# Patient Record
Sex: MALE | Race: WHITE | NOT HISPANIC OR LATINO | Employment: FULL TIME | URBAN - METROPOLITAN AREA
[De-identification: names, ages, dates, MRNs, and addresses within clinical notes are randomized per-mention and may not be internally consistent; named-entity substitution may affect disease eponyms.]

---

## 2020-07-13 ENCOUNTER — TRANSCRIBE ORDERS (OUTPATIENT)
Dept: ADMINISTRATIVE | Facility: HOSPITAL | Age: 55
End: 2020-07-13

## 2020-07-13 DIAGNOSIS — R07.9 CHEST PAIN, UNSPECIFIED TYPE: Primary | ICD-10-CM

## 2020-07-21 ENCOUNTER — APPOINTMENT (OUTPATIENT)
Dept: RESPIRATORY THERAPY | Facility: HOSPITAL | Age: 55
End: 2020-07-21

## 2020-07-21 ENCOUNTER — APPOINTMENT (OUTPATIENT)
Dept: CARDIOLOGY | Facility: HOSPITAL | Age: 55
End: 2020-07-21

## 2020-08-01 ENCOUNTER — LAB (OUTPATIENT)
Dept: LAB | Facility: HOSPITAL | Age: 55
End: 2020-08-01

## 2020-08-01 DIAGNOSIS — Z01.811 PRE-OPERATIVE RESPIRATORY EXAMINATION: Primary | ICD-10-CM

## 2020-08-01 PROCEDURE — C9803 HOPD COVID-19 SPEC COLLECT: HCPCS

## 2020-08-01 PROCEDURE — U0002 COVID-19 LAB TEST NON-CDC: HCPCS

## 2020-08-01 PROCEDURE — U0004 COV-19 TEST NON-CDC HGH THRU: HCPCS

## 2020-08-02 LAB
REF LAB TEST METHOD: NORMAL
SARS-COV-2 RNA RESP QL NAA+PROBE: NOT DETECTED

## 2020-08-03 ENCOUNTER — HOSPITAL ENCOUNTER (OUTPATIENT)
Dept: CARDIOLOGY | Facility: HOSPITAL | Age: 55
Discharge: HOME | End: 2020-08-03
Admitting: FAMILY MEDICINE

## 2020-08-03 ENCOUNTER — APPOINTMENT (OUTPATIENT)
Dept: NUCLEAR MEDICINE | Facility: HOSPITAL | Age: 55
End: 2020-08-03

## 2020-08-03 ENCOUNTER — HOSPITAL ENCOUNTER (OUTPATIENT)
Dept: RESPIRATORY THERAPY | Facility: HOSPITAL | Age: 55
Discharge: HOME | End: 2020-08-03

## 2020-08-03 VITALS
BODY MASS INDEX: 25.06 KG/M2 | HEIGHT: 72 IN | SYSTOLIC BLOOD PRESSURE: 156 MMHG | DIASTOLIC BLOOD PRESSURE: 101 MMHG | WEIGHT: 185 LBS

## 2020-08-03 DIAGNOSIS — R07.9 CHEST PAIN, UNSPECIFIED TYPE: ICD-10-CM

## 2020-08-03 LAB
BH CV ECHO MEAS - ACS: 2.5 CM
BH CV ECHO MEAS - AO MAX PG (FULL): -0.34 MMHG
BH CV ECHO MEAS - AO MAX PG: 5.1 MMHG
BH CV ECHO MEAS - AO MEAN PG (FULL): 0.15 MMHG
BH CV ECHO MEAS - AO MEAN PG: 2.5 MMHG
BH CV ECHO MEAS - AO ROOT AREA (BSA CORRECTED): 1.8
BH CV ECHO MEAS - AO ROOT AREA: 11.2 CM^2
BH CV ECHO MEAS - AO ROOT DIAM: 3.8 CM
BH CV ECHO MEAS - AO V2 MAX: 113.2 CM/SEC
BH CV ECHO MEAS - AO V2 MEAN: 72.6 CM/SEC
BH CV ECHO MEAS - AO V2 VTI: 23.1 CM
BH CV ECHO MEAS - ASC AORTA: 3.5 CM
BH CV ECHO MEAS - AVA(I,A): 4.3 CM^2
BH CV ECHO MEAS - AVA(I,D): 4.3 CM^2
BH CV ECHO MEAS - AVA(V,A): 4.3 CM^2
BH CV ECHO MEAS - AVA(V,D): 4.3 CM^2
BH CV ECHO MEAS - BSA(HAYCOCK): 2.1 M^2
BH CV ECHO MEAS - BSA: 2.1 M^2
BH CV ECHO MEAS - BZI_BMI: 25.1 KILOGRAMS/M^2
BH CV ECHO MEAS - BZI_METRIC_HEIGHT: 182.9 CM
BH CV ECHO MEAS - BZI_METRIC_WEIGHT: 83.9 KG
BH CV ECHO MEAS - EDV(CUBED): 63.9 ML
BH CV ECHO MEAS - EDV(MOD-SP4): 87.4 ML
BH CV ECHO MEAS - EDV(TEICH): 69.9 ML
BH CV ECHO MEAS - EF(CUBED): 74.8 %
BH CV ECHO MEAS - EF(MOD-BP): 66 %
BH CV ECHO MEAS - EF(MOD-SP4): 65.6 %
BH CV ECHO MEAS - EF(TEICH): 67.3 %
BH CV ECHO MEAS - ESV(CUBED): 16.1 ML
BH CV ECHO MEAS - ESV(MOD-SP4): 30 ML
BH CV ECHO MEAS - ESV(TEICH): 22.9 ML
BH CV ECHO MEAS - FS: 36.8 %
BH CV ECHO MEAS - IVS/LVPW: 0.94
BH CV ECHO MEAS - IVSD: 1.2 CM
BH CV ECHO MEAS - LA DIMENSION(2D): 3.8 CM
BH CV ECHO MEAS - LV DIASTOLIC VOL/BSA (35-75): 42.4 ML/M^2
BH CV ECHO MEAS - LV MASS(C)D: 182.4 GRAMS
BH CV ECHO MEAS - LV MASS(C)DI: 88.5 GRAMS/M^2
BH CV ECHO MEAS - LV MAX PG: 5.5 MMHG
BH CV ECHO MEAS - LV MEAN PG: 2.4 MMHG
BH CV ECHO MEAS - LV SYSTOLIC VOL/BSA (12-30): 14.6 ML/M^2
BH CV ECHO MEAS - LV V1 MAX: 116.9 CM/SEC
BH CV ECHO MEAS - LV V1 MEAN: 70.3 CM/SEC
BH CV ECHO MEAS - LV V1 VTI: 24 CM
BH CV ECHO MEAS - LVIDD: 4 CM
BH CV ECHO MEAS - LVIDS: 2.5 CM
BH CV ECHO MEAS - LVOT AREA: 4.2 CM^2
BH CV ECHO MEAS - LVOT DIAM: 2.3 CM
BH CV ECHO MEAS - LVPWD: 1.3 CM
BH CV ECHO MEAS - MV A MAX VEL: 45.9 CM/SEC
BH CV ECHO MEAS - MV DEC SLOPE: 410.4 CM/SEC^2
BH CV ECHO MEAS - MV DEC TIME: 0.19 SEC
BH CV ECHO MEAS - MV E MAX VEL: 76.1 CM/SEC
BH CV ECHO MEAS - MV E/A: 1.7
BH CV ECHO MEAS - MV MAX PG: 2.1 MMHG
BH CV ECHO MEAS - MV MEAN PG: 0.76 MMHG
BH CV ECHO MEAS - MV V2 MAX: 71.8 CM/SEC
BH CV ECHO MEAS - MV V2 MEAN: 40.9 CM/SEC
BH CV ECHO MEAS - MV V2 VTI: 26.3 CM
BH CV ECHO MEAS - MVA(VTI): 3.8 CM^2
BH CV ECHO MEAS - PA ACC TIME: 0.1 SEC
BH CV ECHO MEAS - PA PR(ACCEL): 35.4 MMHG
BH CV ECHO MEAS - PULM A REVS DUR: 0.13 SEC
BH CV ECHO MEAS - PULM A REVS VEL: 31.4 CM/SEC
BH CV ECHO MEAS - PULM DIAS VEL: 42.6 CM/SEC
BH CV ECHO MEAS - PULM S/D: 1.2
BH CV ECHO MEAS - PULM SYS VEL: 52.2 CM/SEC
BH CV ECHO MEAS - QP/QS: 0.98
BH CV ECHO MEAS - RAP SYSTOLE: 3 MMHG
BH CV ECHO MEAS - RV MAX PG: 1.9 MMHG
BH CV ECHO MEAS - RV MEAN PG: 0.96 MMHG
BH CV ECHO MEAS - RV V1 MAX: 68.4 CM/SEC
BH CV ECHO MEAS - RV V1 MEAN: 45.6 CM/SEC
BH CV ECHO MEAS - RV V1 VTI: 16.2 CM
BH CV ECHO MEAS - RVDD: 2.7 CM
BH CV ECHO MEAS - RVOT AREA: 6 CM^2
BH CV ECHO MEAS - RVOT DIAM: 2.8 CM
BH CV ECHO MEAS - RVSP: 19 MMHG
BH CV ECHO MEAS - SI(AO): 125.2 ML/M^2
BH CV ECHO MEAS - SI(CUBED): 23.2 ML/M^2
BH CV ECHO MEAS - SI(LVOT): 48.4 ML/M^2
BH CV ECHO MEAS - SI(MOD-SP4): 27.8 ML/M^2
BH CV ECHO MEAS - SI(TEICH): 22.8 ML/M^2
BH CV ECHO MEAS - SV(AO): 258 ML
BH CV ECHO MEAS - SV(CUBED): 47.8 ML
BH CV ECHO MEAS - SV(LVOT): 99.7 ML
BH CV ECHO MEAS - SV(MOD-SP4): 57.3 ML
BH CV ECHO MEAS - SV(RVOT): 97.5 ML
BH CV ECHO MEAS - SV(TEICH): 47 ML
BH CV ECHO MEAS - TR MAX VEL: 200 CM/SEC
BH CV STRESS BP STAGE 1: NORMAL
BH CV STRESS BP STAGE 2: NORMAL
BH CV STRESS BP STAGE 3: NORMAL
BH CV STRESS DURATION MIN STAGE 1: 3
BH CV STRESS DURATION MIN STAGE 2: 3
BH CV STRESS DURATION MIN STAGE 3: 3
BH CV STRESS DURATION SEC STAGE 1: 0
BH CV STRESS DURATION SEC STAGE 2: 0
BH CV STRESS DURATION SEC STAGE 3: 0
BH CV STRESS GRADE STAGE 1: 10
BH CV STRESS GRADE STAGE 2: 12
BH CV STRESS GRADE STAGE 3: 14
BH CV STRESS HR STAGE 1: 87
BH CV STRESS HR STAGE 2: 112
BH CV STRESS HR STAGE 3: 141
BH CV STRESS METS STAGE 1: 5
BH CV STRESS METS STAGE 2: 7.5
BH CV STRESS METS STAGE 3: 10
BH CV STRESS PROTOCOL 1: NORMAL
BH CV STRESS RECOVERY BP: NORMAL MMHG
BH CV STRESS RECOVERY HR: 82 BPM
BH CV STRESS SPEED STAGE 1: 1.7
BH CV STRESS SPEED STAGE 2: 2.5
BH CV STRESS SPEED STAGE 3: 3.4
BH CV STRESS STAGE 1: 1
BH CV STRESS STAGE 2: 2
BH CV STRESS STAGE 3: 3
LV EF 2D ECHO EST: 60 %
MAXIMAL PREDICTED HEART RATE: 164 BPM
PERCENT MAX PREDICTED HR: 87.8 %
STRESS BASELINE BP: NORMAL MMHG
STRESS BASELINE HR: 67 BPM
STRESS PERCENT HR: 103 %
STRESS POST EXERCISE DUR MIN: 8 MIN
STRESS POST EXERCISE DUR SEC: 21 SEC
STRESS POST PEAK BP: NORMAL MMHG
STRESS POST PEAK HR: 144 BPM
STRESS TARGET HR: 139 BPM

## 2020-08-03 PROCEDURE — 93306 TTE W/DOPPLER COMPLETE: CPT

## 2020-08-03 PROCEDURE — 93018 CV STRESS TEST I&R ONLY: CPT | Performed by: NURSE PRACTITIONER

## 2020-08-03 PROCEDURE — 93017 CV STRESS TEST TRACING ONLY: CPT

## 2020-08-03 PROCEDURE — 93306 TTE W/DOPPLER COMPLETE: CPT | Performed by: INTERNAL MEDICINE

## 2020-11-23 ENCOUNTER — OFFICE VISIT (OUTPATIENT)
Dept: PODIATRY | Facility: CLINIC | Age: 55
End: 2020-11-23

## 2020-11-23 VITALS
WEIGHT: 193 LBS | DIASTOLIC BLOOD PRESSURE: 87 MMHG | HEART RATE: 79 BPM | SYSTOLIC BLOOD PRESSURE: 145 MMHG | HEIGHT: 72 IN | BODY MASS INDEX: 26.14 KG/M2

## 2020-11-23 DIAGNOSIS — M77.41 METATARSALGIA OF BOTH FEET: ICD-10-CM

## 2020-11-23 DIAGNOSIS — M72.2 PLANTAR FASCIITIS, BILATERAL: ICD-10-CM

## 2020-11-23 DIAGNOSIS — M79.672 BILATERAL FOOT PAIN: Primary | ICD-10-CM

## 2020-11-23 DIAGNOSIS — M77.42 METATARSALGIA OF BOTH FEET: ICD-10-CM

## 2020-11-23 DIAGNOSIS — M79.671 BILATERAL FOOT PAIN: Primary | ICD-10-CM

## 2020-11-23 PROCEDURE — 99203 OFFICE O/P NEW LOW 30 MIN: CPT | Performed by: PODIATRIST

## 2020-11-23 RX ORDER — LOSARTAN POTASSIUM 50 MG/1
50 TABLET ORAL DAILY
COMMUNITY
Start: 2020-10-27

## 2020-11-23 RX ORDER — PREDNISONE 20 MG/1
20 TABLET ORAL DAILY
COMMUNITY
Start: 2020-11-18 | End: 2020-12-28

## 2020-11-24 NOTE — PROGRESS NOTES
11/23/2020  Foot and Ankle Surgery - New Patient   Provider: Dr. Jair Roy DPM  Location: [facility]    Subjective:  Cain Snow is a 55 y.o. male.     Chief Complaint   Patient presents with   • Right Foot - Pain   • Left Foot - Pain       HPI: Patient is a 55-year-old male that presents with pain involving his heels and forefoot regions.  States that these symptoms have been bothering him for several months and gradually progressive.  He has noticed recent exacerbation which makes it difficult for him to walk and perform his normal job duties.  He rates the pain an 8 out of 10 most days.  His symptoms are worse at work and on his feet for several hours a day and better with rest.  He also notices limitation with first few steps in the morning.  He denies any previous injuries.  He has not had issues like this in the past.  He also complains of pain gradually progressing to the distal lateral forefoot on the right lower extremity.    No Known Allergies    History reviewed. No pertinent past medical history.    History reviewed. No pertinent surgical history.    History reviewed. No pertinent family history.    Social History     Socioeconomic History   • Marital status:      Spouse name: Not on file   • Number of children: Not on file   • Years of education: Not on file   • Highest education level: Not on file   Tobacco Use   • Smoking status: Never Smoker        Current Outpatient Medications on File Prior to Visit   Medication Sig Dispense Refill   • losartan (COZAAR) 50 MG tablet Take 50 mg by mouth Daily.     • predniSONE (DELTASONE) 20 MG tablet Take 20 mg by mouth Daily.       No current facility-administered medications on file prior to visit.        Review of Systems:  General: Denies fever, chills, fatigue, and weakness.  Eyes: Denies vision loss, blurry vision, and excessive redness.  ENT: Denies hearing issues and difficulty swallowing.  Cardiovascular: Denies palpitations,  "chest pain, or syncopal episodes.  Respiratory: Denies shortness of breath, wheezing, and coughing.  GI: Denies abdominal pain, nausea, and vomiting.   : Denies frequency, hematuria, and urgency.  Musculoskeletal: + Bilateral foot pain  Derm: Denies rash, open wounds, or suspicious lesions.  Neuro: Denies headaches, numbness, loss of coordination, and tremors.  Psych: Denies anxiety and depression.  Endocrine: Denies temperature intolerance and changes in appetite.  Heme: Denies bleeding disorders or abnormal bruising.     Objective   /87   Pulse 79   Ht 182.9 cm (72\")   Wt 87.5 kg (193 lb)   BMI 26.18 kg/m²     Foot/Ankle Exam:       General:   Appearance: appears stated age and healthy    Orientation: AAOx3    Affect: appropriate    Gait: antalgic      VASCULAR      Right Foot Vascularity   Normal vascular exam    Dorsalis pedis:  2+  Posterior tibial:  2+  Skin Temperature: warm    Edema Grading:  None  CFT:  < 3 seconds  Pedal Hair Growth:  Present  Varicosities: none       Left Foot Vascularity   Normal vascular exam    Dorsalis pedis:  2+  Posterior tibial:  2+  Skin Temperature: warm    Edema Grading:  None  CFT:  < 3 seconds  Pedal Hair Growth:  Present  Varicosities: none        NEUROLOGIC     Right Foot Neurologic   Light touch sensation:  Normal  Hot/Cold sensation: normal    Achilles reflex:  2+     Left Foot Neurologic   Light touch sensation:  Normal  Hot/cold sensation: normal    Achilles reflex:  2+     MUSCULOSKELETAL      Right Foot Musculoskeletal   Ecchymosis:  None  Tenderness: lesser metatarsophalangeal joints, plantar fascia, plantar heel and 5th MTPJ    Arch:  Normal     Left Foot Musculoskeletal   Ecchymosis:  None  Tenderness: lesser metatarsophalangeal joints, plantar fascia and plantar heel    Arch:  Normal     MUSCLE STRENGTH     Right Foot Muscle Strength   Normal strength    Foot dorsiflexion:  5  Foot plantar flexion:  5  Foot inversion:  5  Foot eversion:  5     Left Foot " Muscle Strength   Normal strength    Foot dorsiflexion:  5  Foot plantar flexion:  5  Foot inversion:  5  Foot eversion:  5     DERMATOLOGIC     Right Foot Dermatologic   Skin: skin intact       Left Foot Dermatologic   Skin: skin intact       TESTS     Right Foot Tests   Calcaneal squeeze: positive       Left Foot Tests   Calcaneal squeeze: positive        Right Foot Additional Comments Moderate equinus contracture with knee extended and flexed, bilateral.  Pain with palpation to the plantar medial calcaneal tuberosity region.  No gross deformity or instability.  Moderate soft tissue contracture to both feet      Assessment/Plan   Diagnoses and all orders for this visit:    1. Bilateral foot pain (Primary)  -     XR Foot 3+ View Bilateral    2. Plantar fasciitis, bilateral    3. Metatarsalgia of both feet      Patient presents with pain involving the predominantly the plantar aspects of both heels.  He also complains of pain involving the plantar lateral aspect of the right forefoot.  I explained that his symptoms are consistent with plantar fasciitis.  The pain to the forefoot region on the right lower extremity is likely secondary to compensation and altered weightbearing.  We did discuss the diagnoses and treatment options at length.  He does not have any obvious signs of inflammation to the heels today and I have recommended that we proceed with conservative options including over-the-counter arch supports with metatarsal pad for forefoot offloading.  We did review proper use and effects of the devices.  I have also recommended that he perform stretching and manual therapy on a routine basis.  He is to avoid barefoot and unsupported weightbearing.  We did discuss rice therapy and proper use of anti-inflammatories.  I would like to see him in 4 weeks for reevaluation.  If he continues to have pain, may consider steroid injections at that time.    Orders Placed This Encounter   Procedures   • XR Foot 3+ View  Bilateral     RM 14, WB, bilateral foot pain 3 months     Order Specific Question:   Reason for Exam:     Answer:   bilateral foot pain     Order Specific Question:   Does this patient have a diabetic monitoring/medication delivering device on?     Answer:   No        Note is dictated utilizing voice recognition software. Unfortunately this leads to occasional typographical errors. I apologize in advance if the situation occurs. If questions occur please do not hesitate to call our office.

## 2020-11-24 NOTE — PATIENT INSTRUCTIONS
Plantar Fasciitis    Plantar fasciitis is a painful foot condition that affects the heel. It occurs when the band of tissue that connects the toes to the heel bone (plantar fascia) becomes irritated. This can happen as the result of exercising too much or doing other repetitive activities (overuse injury).  The pain from plantar fasciitis can range from mild irritation to severe pain that makes it difficult to walk or move. The pain is usually worse in the morning after sleeping, or after sitting or lying down for a while. Pain may also be worse after long periods of walking or standing.  What are the causes?  This condition may be caused by:  · Standing for long periods of time.  · Wearing shoes that do not have good arch support.  · Doing activities that put stress on joints (high-impact activities), including running, aerobics, and ballet.  · Being overweight.  · An abnormal way of walking (gait).  · Tight muscles in the back of your lower leg (calf).  · High arches in your feet.  · Starting a new athletic activity.  What are the signs or symptoms?  The main symptom of this condition is heel pain. Pain may:  · Be worse with first steps after a time of rest, especially in the morning after sleeping or after you have been sitting or lying down for a while.  · Be worse after long periods of standing still.  · Decrease after 30-45 minutes of activity, such as gentle walking.  How is this diagnosed?  This condition may be diagnosed based on your medical history and your symptoms. Your health care provider may ask questions about your activity level. Your health care provider will do a physical exam to check for:  · A tender area on the bottom of your foot.  · A high arch in your foot.  · Pain when you move your foot.  · Difficulty moving your foot.  You may have imaging tests to confirm the diagnosis, such as:  · X-rays.  · Ultrasound.  · MRI.  How is this treated?  Treatment for plantar fasciitis depends on how  severe your condition is. Treatment may include:  · Rest, ice, applying pressure (compression), and raising the affected foot (elevation). This may be called RICE therapy. Your health care provider may recommend RICE therapy along with over-the-counter pain medicines to manage your pain.  · Exercises to stretch your calves and your plantar fascia.  · A splint that holds your foot in a stretched, upward position while you sleep (night splint).  · Physical therapy to relieve symptoms and prevent problems in the future.  · Injections of steroid medicine (cortisone) to relieve pain and inflammation.  · Stimulating your plantar fascia with electrical impulses (extracorporeal shock wave therapy). This is usually the last treatment option before surgery.  · Surgery, if other treatments have not worked after 12 months.  Follow these instructions at home:    Managing pain, stiffness, and swelling  · If directed, put ice on the painful area:  ? Put ice in a plastic bag, or use a frozen bottle of water.  ? Place a towel between your skin and the bag or bottle.  ? Roll the bottom of your foot over the bag or bottle.  ? Do this for 20 minutes, 2-3 times a day.  · Wear athletic shoes that have air-sole or gel-sole cushions, or try wearing soft shoe inserts that are designed for plantar fasciitis.  · Raise (elevate) your foot above the level of your heart while you are sitting or lying down.  Activity  · Avoid activities that cause pain. Ask your health care provider what activities are safe for you.  · Do physical therapy exercises and stretches as told by your health care provider.  · Try activities and forms of exercise that are easier on your joints (low-impact). Examples include swimming, water aerobics, and biking.  General instructions  · Take over-the-counter and prescription medicines only as told by your health care provider.  · Wear a night splint while sleeping, if told by your health care provider. Loosen the splint  if your toes tingle, become numb, or turn cold and blue.  · Maintain a healthy weight, or work with your health care provider to lose weight as needed.  · Keep all follow-up visits as told by your health care provider. This is important.  Contact a health care provider if you:  · Have symptoms that do not go away after caring for yourself at home.  · Have pain that gets worse.  · Have pain that affects your ability to move or do your daily activities.  Summary  · Plantar fasciitis is a painful foot condition that affects the heel. It occurs when the band of tissue that connects the toes to the heel bone (plantar fascia) becomes irritated.  · The main symptom of this condition is heel pain that may be worse after exercising too much or standing still for a long time.  · Treatment varies, but it usually starts with rest, ice, compression, and elevation (RICE therapy) and over-the-counter medicines to manage pain.  This information is not intended to replace advice given to you by your health care provider. Make sure you discuss any questions you have with your health care provider.  Document Revised: 11/30/2018 Document Reviewed: 10/15/2018  Elsevier Patient Education © 2020 [employer]

## 2020-12-28 ENCOUNTER — OFFICE VISIT (OUTPATIENT)
Dept: PODIATRY | Facility: CLINIC | Age: 55
End: 2020-12-28

## 2020-12-28 VITALS
BODY MASS INDEX: 25.6 KG/M2 | HEIGHT: 72 IN | DIASTOLIC BLOOD PRESSURE: 84 MMHG | HEART RATE: 80 BPM | WEIGHT: 189 LBS | SYSTOLIC BLOOD PRESSURE: 120 MMHG

## 2020-12-28 DIAGNOSIS — M20.41 HAMMERTOES OF BOTH FEET: ICD-10-CM

## 2020-12-28 DIAGNOSIS — M77.41 METATARSALGIA OF BOTH FEET: ICD-10-CM

## 2020-12-28 DIAGNOSIS — M77.42 METATARSALGIA OF BOTH FEET: ICD-10-CM

## 2020-12-28 DIAGNOSIS — M72.2 PLANTAR FASCIITIS, BILATERAL: Primary | ICD-10-CM

## 2020-12-28 DIAGNOSIS — M20.42 HAMMERTOES OF BOTH FEET: ICD-10-CM

## 2020-12-28 PROCEDURE — 99213 OFFICE O/P EST LOW 20 MIN: CPT | Performed by: PODIATRIST

## 2020-12-28 RX ORDER — DICLOFENAC SODIUM 20 MG/G
SOLUTION TOPICAL
COMMUNITY
Start: 2020-11-24

## 2020-12-28 NOTE — PROGRESS NOTES
"12/28/2020  Foot and Ankle Surgery - Established Patient/Follow-up  Provider: Dr. Jair Roy DPM  Location: [facility]    Subjective:  Cain Snow is a 55 y.o. male.     Chief Complaint   Patient presents with   • Left Foot - Follow-up   • Right Foot - Follow-up       HPI: Patient returns for follow-up on bilateral foot pain.  He states that he has noticed improvement with the inserts.  He continues to have discomfort to the heels with increased activity or standing.  He also notes bruising to his toenails and discomfort to the forefoot with barefoot walking.    No Known Allergies    Current Outpatient Medications on File Prior to Visit   Medication Sig Dispense Refill   • Ibuprofen-Famotidine (DUEXIS PO) Take  by mouth.     • losartan (COZAAR) 50 MG tablet Take 50 mg by mouth Daily.     • Pennsaid 2 % solution      • [DISCONTINUED] predniSONE (DELTASONE) 20 MG tablet Take 20 mg by mouth Daily.       No current facility-administered medications on file prior to visit.        Objective   /84   Pulse 80   Ht 182.9 cm (72\")   Wt 85.7 kg (189 lb)   BMI 25.63 kg/m²      General:   Appearance: appears stated age and healthy    Orientation: AAOx3    Affect: appropriate    Gait: antalgic       VASCULAR       Right Foot Vascularity   Normal vascular exam    Dorsalis pedis:  2+  Posterior tibial:  2+  Skin Temperature: warm    Edema Grading:  None  CFT:  < 3 seconds  Pedal Hair Growth:  Present  Varicosities: none        Left Foot Vascularity   Normal vascular exam    Dorsalis pedis:  2+  Posterior tibial:  2+  Skin Temperature: warm    Edema Grading:  None  CFT:  < 3 seconds  Pedal Hair Growth:  Present  Varicosities: none        NEUROLOGIC      Right Foot Neurologic   Light touch sensation:  Normal  Hot/Cold sensation: normal    Achilles reflex:  2+      Left Foot Neurologic   Light touch sensation:  Normal  Hot/cold sensation: normal    Achilles reflex:  2+      MUSCULOSKELETAL       Right " Foot Musculoskeletal   Ecchymosis:  None  Tenderness: lesser metatarsophalangeal joints, plantar fascia, plantar heel and 5th MTPJ    Arch:  Normal      Left Foot Musculoskeletal   Ecchymosis:  None  Tenderness: lesser metatarsophalangeal joints, plantar fascia and plantar heel    Arch:  Normal      MUSCLE STRENGTH      Right Foot Muscle Strength   Normal strength    Foot dorsiflexion:  5  Foot plantar flexion:  5  Foot inversion:  5  Foot eversion:  5      Left Foot Muscle Strength   Normal strength    Foot dorsiflexion:  5  Foot plantar flexion:  5  Foot inversion:  5  Foot eversion:  5      DERMATOLOGIC      Right Foot Dermatologic   Skin: skin intact        Left Foot Dermatologic   Skin: skin intact        TESTS      Right Foot Tests   Calcaneal squeeze: positive        Left Foot Tests   Calcaneal squeeze: positive       Foot Additional Comments: Less discomfort with palpation to the heel regions.  Semirigid hammer digit deformities, bilateral.  No prominent pain or limitations of the forefoot region.  No progressive deformity or instability.  Moderate equinus and soft tissue rigidity to both lower extremities    Assessment/Plan   Diagnoses and all orders for this visit:    1. Plantar fasciitis, bilateral (Primary)    2. Metatarsalgia of both feet    3. Hammertoes of both feet      Patient continues to have mild discomfort involving the heel regions.  He has noticed improvement with the inserts to the heel and forefoot regions.  He does have semirigid hammer digit deformities.  I have asked that he continue with the inserts and I have also recommended that he obtain crest pads.  We did discuss proper use and effects.  I also feel that he would benefit from formal physical therapy.  Patient understands and agrees.  I have asked that he continue to wear the inserts on a daily basis and perform at home exercises.  He is to monitor closely and call with any additional issues or concerns.  I would like to see him in  6 weeks for reevaluation.    No orders of the defined types were placed in this encounter.         Note is dictated utilizing voice recognition software. Unfortunately this leads to occasional typographical errors. I apologize in advance if the situation occurs. If questions occur please do not hesitate to call our office.

## 2023-06-14 ENCOUNTER — OFFICE (OUTPATIENT)
Dept: URBAN - METROPOLITAN AREA CLINIC 64 | Facility: CLINIC | Age: 58
End: 2023-06-14

## 2023-06-14 VITALS
HEART RATE: 68 BPM | DIASTOLIC BLOOD PRESSURE: 90 MMHG | HEIGHT: 72 IN | WEIGHT: 197 LBS | SYSTOLIC BLOOD PRESSURE: 136 MMHG

## 2023-06-14 DIAGNOSIS — R19.7 DIARRHEA, UNSPECIFIED: ICD-10-CM

## 2023-06-14 DIAGNOSIS — R19.4 CHANGE IN BOWEL HABIT: ICD-10-CM

## 2023-06-14 DIAGNOSIS — R15.2 FECAL URGENCY: ICD-10-CM

## 2023-06-14 PROCEDURE — 99203 OFFICE O/P NEW LOW 30 MIN: CPT | Performed by: NURSE PRACTITIONER

## 2023-06-23 ENCOUNTER — OFFICE (OUTPATIENT)
Dept: URBAN - METROPOLITAN AREA PATHOLOGY 4 | Facility: PATHOLOGY | Age: 58
End: 2023-06-23

## 2023-06-23 ENCOUNTER — ON CAMPUS - OUTPATIENT (OUTPATIENT)
Dept: URBAN - METROPOLITAN AREA HOSPITAL 2 | Facility: HOSPITAL | Age: 58
End: 2023-06-23

## 2023-06-23 VITALS
SYSTOLIC BLOOD PRESSURE: 126 MMHG | OXYGEN SATURATION: 95 % | OXYGEN SATURATION: 97 % | DIASTOLIC BLOOD PRESSURE: 117 MMHG | SYSTOLIC BLOOD PRESSURE: 153 MMHG | DIASTOLIC BLOOD PRESSURE: 84 MMHG | DIASTOLIC BLOOD PRESSURE: 95 MMHG | RESPIRATION RATE: 20 BRPM | SYSTOLIC BLOOD PRESSURE: 141 MMHG | HEART RATE: 70 BPM | SYSTOLIC BLOOD PRESSURE: 116 MMHG | SYSTOLIC BLOOD PRESSURE: 122 MMHG | SYSTOLIC BLOOD PRESSURE: 123 MMHG | RESPIRATION RATE: 16 BRPM | DIASTOLIC BLOOD PRESSURE: 91 MMHG | RESPIRATION RATE: 18 BRPM | DIASTOLIC BLOOD PRESSURE: 96 MMHG | DIASTOLIC BLOOD PRESSURE: 79 MMHG | HEART RATE: 80 BPM | HEART RATE: 81 BPM | OXYGEN SATURATION: 96 % | TEMPERATURE: 97.5 F | HEART RATE: 79 BPM | HEIGHT: 72 IN | SYSTOLIC BLOOD PRESSURE: 127 MMHG | HEART RATE: 84 BPM | DIASTOLIC BLOOD PRESSURE: 93 MMHG | WEIGHT: 200 LBS

## 2023-06-23 DIAGNOSIS — D12.3 BENIGN NEOPLASM OF TRANSVERSE COLON: ICD-10-CM

## 2023-06-23 DIAGNOSIS — K57.30 DIVERTICULOSIS OF LARGE INTESTINE WITHOUT PERFORATION OR ABS: ICD-10-CM

## 2023-06-23 DIAGNOSIS — D12.2 BENIGN NEOPLASM OF ASCENDING COLON: ICD-10-CM

## 2023-06-23 DIAGNOSIS — K52.9 NONINFECTIVE GASTROENTERITIS AND COLITIS, UNSPECIFIED: ICD-10-CM

## 2023-06-23 DIAGNOSIS — R19.4 CHANGE IN BOWEL HABIT: ICD-10-CM

## 2023-06-23 DIAGNOSIS — K64.1 SECOND DEGREE HEMORRHOIDS: ICD-10-CM

## 2023-06-23 PROBLEM — K63.5 POLYP OF COLON: Status: ACTIVE | Noted: 2023-06-23

## 2023-06-23 LAB
GI HISTOLOGY: A. SELECT: (no result)
GI HISTOLOGY: B. SELECT: (no result)
GI HISTOLOGY: C. UNSPECIFIED: (no result)
GI HISTOLOGY: D. UNSPECIFIED: (no result)
GI HISTOLOGY: PDF REPORT: (no result)

## 2023-06-23 PROCEDURE — 45380 COLONOSCOPY AND BIOPSY: CPT | Mod: 59 | Performed by: INTERNAL MEDICINE

## 2023-06-23 PROCEDURE — 88305 TISSUE EXAM BY PATHOLOGIST: CPT | Performed by: INTERNAL MEDICINE

## 2023-06-23 PROCEDURE — 45385 COLONOSCOPY W/LESION REMOVAL: CPT | Performed by: INTERNAL MEDICINE

## 2023-06-23 RX ADMIN — ONDANSETRON HYDROCHLORIDE 4 MG: 4 TABLET, FILM COATED ORAL at 14:51

## 2024-09-27 ENCOUNTER — OFFICE (OUTPATIENT)
Dept: URBAN - METROPOLITAN AREA CLINIC 64 | Facility: CLINIC | Age: 59
End: 2024-09-27

## 2024-09-27 ENCOUNTER — OFFICE (OUTPATIENT)
Age: 59
End: 2024-09-27